# Patient Record
Sex: FEMALE | Race: OTHER | HISPANIC OR LATINO
[De-identification: names, ages, dates, MRNs, and addresses within clinical notes are randomized per-mention and may not be internally consistent; named-entity substitution may affect disease eponyms.]

---

## 2019-07-26 PROBLEM — Z00.00 ENCOUNTER FOR PREVENTIVE HEALTH EXAMINATION: Status: ACTIVE | Noted: 2019-07-26

## 2019-07-31 ENCOUNTER — APPOINTMENT (OUTPATIENT)
Dept: ORTHOPEDIC SURGERY | Facility: CLINIC | Age: 39
End: 2019-07-31
Payer: MEDICAID

## 2019-07-31 VITALS — HEART RATE: 78 BPM | SYSTOLIC BLOOD PRESSURE: 130 MMHG | DIASTOLIC BLOOD PRESSURE: 83 MMHG

## 2019-07-31 PROCEDURE — 99204 OFFICE O/P NEW MOD 45 MIN: CPT

## 2019-07-31 NOTE — HISTORY OF PRESENT ILLNESS
[FreeTextEntry1] : 07/31/2019: Patient is a 38-year-old right-hand-dominant female who works in a factory packing present, who presents to the office today with bilateral hand paresthesias in all fingers. Her symptoms have been present since June of last year. Her symptoms are constant and worse with driving. She has taken ibuprofen with very little relief. She presents today to the office for further treatment options.

## 2019-07-31 NOTE — CONSULT LETTER
[Consult Letter:] : I had the pleasure of evaluating your patient, [unfilled]. [Please see my note below.] : Please see my note below. [Consult Closing:] : Thank you very much for allowing me to participate in the care of this patient.  If you have any questions, please do not hesitate to contact me. [Sincerely,] : Sincerely, [FreeTextEntry3] : Dr. Dianne Gonzalez\par Orthopaedic Surgery\par Hand & Upper Extremity.\par

## 2019-07-31 NOTE — ASSESSMENT
[FreeTextEntry1] : Patient with bilateral carpal tunnel syndrome. The nature of this condition was described at length with the patient she verbalized understanding. It was confirmed with the results of an EMG from prior to her visit here. Conservative observation versus surgical release risks and benefits were discussed at length with the patient and she wishes to proceed with a surgical release of the right carpal tunnel. She'll be booked for the next available surgical date.

## 2019-08-09 ENCOUNTER — APPOINTMENT (OUTPATIENT)
Dept: ORTHOPEDIC SURGERY | Facility: AMBULATORY MEDICAL SERVICES | Age: 39
End: 2019-08-09
Payer: MEDICAID

## 2019-08-09 PROCEDURE — 64721 CARPAL TUNNEL SURGERY: CPT | Mod: RT

## 2019-08-16 ENCOUNTER — APPOINTMENT (OUTPATIENT)
Dept: ORTHOPEDIC SURGERY | Facility: CLINIC | Age: 39
End: 2019-08-16
Payer: MEDICAID

## 2019-08-16 PROCEDURE — 99213 OFFICE O/P EST LOW 20 MIN: CPT | Mod: 24

## 2019-08-16 NOTE — HISTORY OF PRESENT ILLNESS
[___ Days Post Op] : post op day #[unfilled] [Erythema] : not erythematous [Clean/Dry/Intact] : clean, dry and intact [Swelling] : swollen [Dehiscence] : not dehisced [Vascular Intact] : ~T peripheral vascular exam normal [Doing Well] : is doing well [Excellent Pain Control] : has excellent pain control [de-identified] : Right carpal tunnel release.\par DOS 8/9/19 [No Sign of Infection] : is showing no signs of infection [de-identified] : range of motion of the digits of the right hand is slightly limited secondary to swelling and stiffness to the palm distance 0.5 cm\par \par Left wrist positive Tinel positive Phalen's, subjective paresthesias in the thumb index and middle fingers, 2. discrimination 6 mm [de-identified] : the patient is a 38-year-old female 7 days status post right carpal tunnel release.She has improvement in the paresthesias in her digits, she has mild pain at the surgical site that is within normal limits. She continues to experience paresthesias in the left median nerve distribution which occur on a daily basis. [de-identified] : the patient is a 38-year-old female one week status post right carpal tunnel release healing well.She continues to have symptoms of left carpal tunnel syndrome, risks and benefits of continued conservative treatment versus surgical release were discussed the patient wishes to proceed with a left carpal tunnel release. She will be booked for the procedure and may continue activity as tolerated until that time.

## 2019-09-06 ENCOUNTER — APPOINTMENT (OUTPATIENT)
Dept: ORTHOPEDIC SURGERY | Facility: CLINIC | Age: 39
End: 2019-09-06
Payer: MEDICAID

## 2019-09-06 PROCEDURE — 99024 POSTOP FOLLOW-UP VISIT: CPT

## 2019-09-06 NOTE — HISTORY OF PRESENT ILLNESS
[Clean/Dry/Intact] : clean, dry and intact [Swelling] : swollen [Vascular Intact] : ~T peripheral vascular exam normal [Doing Well] : is doing well [Excellent Pain Control] : has excellent pain control [No Sign of Infection] : is showing no signs of infection [___ Months Post Op] : [unfilled] months post op [Erythema] : not erythematous [Dehiscence] : not dehisced [de-identified] : Right carpal tunnel release.\par DOS 8/9/19 [de-identified] : 8/16/19: the patient is a 38-year-old female 7 days status post right carpal tunnel release.She has improvement in the paresthesias in her digits, she has mild pain at the surgical site that is within normal limits. She continues to experience paresthesias in the left median nerve distribution which occur on a daily basis.\par \par 09/06/2019: Patient returns for repeat evaluation 1 month after right carpal tunnel release. Overall doing much better. No residual symptoms and no complaints of fevers or chills.  [de-identified] : Incision well healed. No signs of infection. Range of motion of wrist and digits full. Sensation grossly intact and distal pulses 2+. \par \par Left wrist positive Tinel positive Phalen's, subjective paresthesias in the thumb index and middle fingers, 2. discrimination 6 mm [de-identified] : s/p Right Carpal Tunnel Release [de-identified] : The patient is a 38-year-old female one month status post right carpal tunnel release healing well. She continues to have symptoms of left carpal tunnel syndrome. She is booked for the middle of the month for the left carpal tunnel release. She may continue activity as tolerated until that time.

## 2019-09-13 ENCOUNTER — APPOINTMENT (OUTPATIENT)
Dept: ORTHOPEDIC SURGERY | Facility: AMBULATORY MEDICAL SERVICES | Age: 39
End: 2019-09-13
Payer: MEDICAID

## 2019-09-13 PROCEDURE — 64721 CARPAL TUNNEL SURGERY: CPT | Mod: 79,LT

## 2019-09-20 ENCOUNTER — APPOINTMENT (OUTPATIENT)
Dept: ORTHOPEDIC SURGERY | Facility: CLINIC | Age: 39
End: 2019-09-20
Payer: MEDICAID

## 2019-09-20 DIAGNOSIS — Z98.890 OTHER SPECIFIED POSTPROCEDURAL STATES: ICD-10-CM

## 2019-09-20 DIAGNOSIS — G56.03 CARPAL TUNNEL SYNDROM,BILATERAL UPPER LIMBS: ICD-10-CM

## 2019-09-20 PROCEDURE — 99024 POSTOP FOLLOW-UP VISIT: CPT

## 2019-09-20 NOTE — HISTORY OF PRESENT ILLNESS
[Clean/Dry/Intact] : clean, dry and intact [Swelling] : swollen [Doing Well] : is doing well [Vascular Intact] : ~T peripheral vascular exam normal [Excellent Pain Control] : has excellent pain control [No Sign of Infection] : is showing no signs of infection [___ Days Post Op] : post op day #[unfilled] [Erythema] : not erythematous [Dehiscence] : not dehisced [de-identified] : 09/20/19: the patient is a 38-year-old female 7 days status post left carpal tunnel release. She has improvement in the paresthesias in her digits, she has mild pain at the surgical site that is within normal limits. She denies any fevers or chills. [de-identified] : Right carpal tunnel release.\par DOS 09/13/2019 [de-identified] : Incision well healed. No signs of infection. Range of motion of wrist and digits full. Sensation grossly intact and distal pulses 2+. \par \par Left wrist positive Tinel positive Phalen's, subjective paresthesias in the thumb index and middle fingers, 2. discrimination 6 mm [de-identified] : s/p Left Carpal Tunnel Release [de-identified] : The patient is a 38-year-old female 7 days status post left carpal tunnel release healing well. She may resume activity as tolerated  by this weekend and return to work on 09/30. The patient is in agreement with this plan.

## 2021-02-11 ENCOUNTER — APPOINTMENT (OUTPATIENT)
Dept: RHEUMATOLOGY | Facility: CLINIC | Age: 41
End: 2021-02-11

## 2021-04-08 ENCOUNTER — APPOINTMENT (OUTPATIENT)
Dept: RHEUMATOLOGY | Facility: CLINIC | Age: 41
End: 2021-04-08
Payer: MEDICAID

## 2021-04-08 VITALS
RESPIRATION RATE: 17 BRPM | TEMPERATURE: 98.6 F | DIASTOLIC BLOOD PRESSURE: 80 MMHG | SYSTOLIC BLOOD PRESSURE: 120 MMHG | HEIGHT: 60 IN

## 2021-04-08 DIAGNOSIS — Z83.3 FAMILY HISTORY OF DIABETES MELLITUS: ICD-10-CM

## 2021-04-08 DIAGNOSIS — I10 ESSENTIAL (PRIMARY) HYPERTENSION: ICD-10-CM

## 2021-04-08 DIAGNOSIS — M25.50 PAIN IN UNSPECIFIED JOINT: ICD-10-CM

## 2021-04-08 PROCEDURE — 99204 OFFICE O/P NEW MOD 45 MIN: CPT

## 2021-04-08 PROCEDURE — 99072 ADDL SUPL MATRL&STAF TM PHE: CPT

## 2021-04-08 RX ORDER — OXYCODONE AND ACETAMINOPHEN 5; 325 MG/1; MG/1
5-325 TABLET ORAL
Qty: 5 | Refills: 0 | Status: DISCONTINUED | COMMUNITY
Start: 2019-09-13 | End: 2021-04-08

## 2021-04-08 RX ORDER — OXYCODONE AND ACETAMINOPHEN 5; 325 MG/1; MG/1
5-325 TABLET ORAL EVERY 8 HOURS
Qty: 5 | Refills: 0 | Status: DISCONTINUED | COMMUNITY
Start: 2019-08-09 | End: 2021-04-08

## 2021-04-08 NOTE — PHYSICAL EXAM
[General Appearance - Well Nourished] : well nourished [General Appearance - Well Developed] : well developed [Sclera] : the sclera and conjunctiva were normal [Hearing Threshold Finger Rub Not Charles] : hearing was normal [Nail Clubbing] : no clubbing  or cyanosis of the fingernails [Motor Tone] : muscle strength and tone were normal [Affect] : the affect was normal [Mood] : the mood was normal [FreeTextEntry1] : finger with skin thickening and reduce wrinkles, no digital pitting

## 2021-04-08 NOTE — ASSESSMENT
[FreeTextEntry1] : 41 yo woman with poorly controlled diabetes presents with whole body pain.  most likley related to occupation however states that staying active helps her joint pain.  will evaluate for inflammatory arthropathy.  patient with tennis elbow most likely related to occupation and OA of the hands.  questionable raynauds and think skin over the finger.  unclear if diabetic skin changes. no prayer sign    \par \par --check inflammatory markers and serologies \par --will give mobic given GERd symptoms \par --will check celiac antibodies and sent to GI given frequent diarrhea \par --xray hands and elbows \par --tennis elbow strap \par --to consider spep and immunoglobulins on next visit \par

## 2021-04-08 NOTE — HISTORY OF PRESENT ILLNESS
[FreeTextEntry1] : 39 yo woman with poorly controlled diabetes ( on insulin), HTN, HLD, obesity, asthma , GERD,  CTS ( s/p surgery b/l hands) presents for evaluation of whole body pain.  started about 6 months ago.  pain worse in her elbows.  no joint swelling appreciated.   pain severe all day.  activity does improve pain.  she has morning stiffness for about 2 hours.  no back pain.  pain mostly of thenads , elbows although she states that her whole body hurts.  she works  at [employer] standing 8 hours a day. takes iburprofen with pain relieve \par \par patient with diarrhea that is frequent.  no blood in stool.  no n/v. not seen by GI \par \par patient complain of red burning eye on occasion and possibly related to being tire.  \par \par questionable Raynaud \par \par NO cough/sob, rashes, psoriasis, alopecia, malar, photosensitivity, oral ulcers, sinus issues, CP, serositis, dry mouth and eye, DVT/PE, pregnant 3 times full term babies noncomplicated. No history of STDs\par \par \par \par No RA, thyroidism, lupus in the family \par \par patient getting opioids from PMD  [Weight Loss] : no weight loss [Fever] : no fever [Chills] : no chills [Malar Facial Rash] : no malar facial rash [Skin Lesions] : no lesions [Skin Nodules] : no skin nodules [Oral Ulcers] : no oral ulcers [Cough] : no cough [Dry Mouth] : no dry mouth [Shortness of Breath] : no shortness of breath [Chest Pain] : no chest pain [Dyspnea] : no dyspnea [Dry Eyes] : no dry eyes

## 2021-04-08 NOTE — REVIEW OF SYSTEMS
[As Noted in HPI] : as noted in HPI [Diarrhea] : diarrhea [Fever] : no fever [Chills] : no chills [Nosebleeds] : no nosebleeds [Chest Pain] : no chest pain [Palpitations] : no palpitations [Cough] : no cough [SOB on Exertion] : no shortness of breath during exertion [Constipation] : no constipation

## 2021-04-14 ENCOUNTER — APPOINTMENT (OUTPATIENT)
Dept: RADIOLOGY | Facility: CLINIC | Age: 41
End: 2021-04-14
Payer: MEDICAID

## 2021-04-14 ENCOUNTER — OUTPATIENT (OUTPATIENT)
Dept: OUTPATIENT SERVICES | Facility: HOSPITAL | Age: 41
LOS: 1 days | End: 2021-04-14
Payer: MEDICAID

## 2021-04-14 DIAGNOSIS — M25.50 PAIN IN UNSPECIFIED JOINT: ICD-10-CM

## 2021-04-14 PROCEDURE — 73080 X-RAY EXAM OF ELBOW: CPT

## 2021-04-14 PROCEDURE — 73130 X-RAY EXAM OF HAND: CPT | Mod: 26,50

## 2021-04-14 PROCEDURE — 73080 X-RAY EXAM OF ELBOW: CPT | Mod: 26,50

## 2021-04-14 PROCEDURE — 73130 X-RAY EXAM OF HAND: CPT

## 2021-06-10 ENCOUNTER — APPOINTMENT (OUTPATIENT)
Dept: RHEUMATOLOGY | Facility: CLINIC | Age: 41
End: 2021-06-10
Payer: MEDICAID

## 2021-06-10 VITALS
HEIGHT: 60 IN | OXYGEN SATURATION: 96 % | RESPIRATION RATE: 17 BRPM | HEART RATE: 79 BPM | SYSTOLIC BLOOD PRESSURE: 108 MMHG | DIASTOLIC BLOOD PRESSURE: 78 MMHG | TEMPERATURE: 98.6 F

## 2021-06-10 VITALS
BODY MASS INDEX: 33.38 KG/M2 | WEIGHT: 170 LBS | OXYGEN SATURATION: 97 % | DIASTOLIC BLOOD PRESSURE: 78 MMHG | TEMPERATURE: 98.6 F | HEIGHT: 60 IN | SYSTOLIC BLOOD PRESSURE: 120 MMHG | RESPIRATION RATE: 17 BRPM | HEART RATE: 98 BPM

## 2021-06-10 DIAGNOSIS — R23.4 CHANGES IN SKIN TEXTURE: ICD-10-CM

## 2021-06-10 PROCEDURE — 99214 OFFICE O/P EST MOD 30 MIN: CPT

## 2021-06-10 NOTE — HISTORY OF PRESENT ILLNESS
[FreeTextEntry1] : 39 yo woman with poorly controlled diabetes ( on insulin), HTN, HLD, obesity, asthma , GERD,  CTS ( s/p surgery b/l hands) presents for evaluation of whole body pain.  started about 6 months ago.  pain worse in her elbows.  no joint swelling appreciated.   pain severe all day.  activity does improve pain.  she has morning stiffness for about 2 hours.  no back pain.  pain mostly of thenads , elbows although she states that her whole body hurts.  she works  at [employer] standing 8 hours a day. takes iburprofen with pain relieve \par \par patient with diarrhea that is frequent.  no blood in stool.  no n/v. not seen by GI \par \par patient complain of red burning eye on occasion and possibly related to being tire.  \par \par questionable Raynaud \par \par NO cough/sob, rashes, psoriasis, alopecia, malar, photosensitivity, oral ulcers, sinus issues, CP, serositis, dry mouth and eye, DVT/PE, pregnant 3 times full term babies noncomplicated. No history of STDs\par \par \par \par No RA, thyroidism, lupus in the family \par \par patient getting opioids from PMD \par \par today: patient was started on mobic and states that she feels better when she takes this.  the pain has subsided but still present.  when is works she is using the elbow straps.  she continues to complain of diffuse body pain.  she did not do blood work\par \par XRay: \par hand: Right distal phalangeal tuff tip avulsion.  tiny spicule of bone density adjacent to Right 2nd distal phalgeal tuft tip . other wise preserved Joint space \par \par elbow: normal

## 2021-06-10 NOTE — PHYSICAL EXAM
[General Appearance - Well Nourished] : well nourished [General Appearance - Well Developed] : well developed [Sclera] : the sclera and conjunctiva were normal [Hearing Threshold Finger Rub Not [place]] : hearing was normal [Nail Clubbing] : no clubbing  or cyanosis of the fingernails [Motor Tone] : muscle strength and tone were normal [] : no rash [FreeTextEntry1] : hands with thinken skin changes  [Affect] : the affect was normal [Mood] : the mood was normal

## 2021-06-10 NOTE — ASSESSMENT
[FreeTextEntry1] : 41 yo woman with poorly controlled diabetes presents with whole body pain and hands changes involving skin thinking and cyanosis.  No gross synovitis.  unclear if this related to scleroderma vs diabetic cheiroarthropathy vs scleromyxedema\par \par --check serologies, spep, \par --cont with mobic \par --cont with elboiw straps \par --start voltaren gel to apply over area of tenderness at elbow

## 2021-07-22 ENCOUNTER — APPOINTMENT (OUTPATIENT)
Dept: GASTROENTEROLOGY | Facility: CLINIC | Age: 41
End: 2021-07-22

## 2021-07-26 ENCOUNTER — APPOINTMENT (OUTPATIENT)
Dept: RHEUMATOLOGY | Facility: CLINIC | Age: 41
End: 2021-07-26
Payer: MEDICAID

## 2021-07-26 VITALS
DIASTOLIC BLOOD PRESSURE: 74 MMHG | SYSTOLIC BLOOD PRESSURE: 110 MMHG | OXYGEN SATURATION: 95 % | BODY MASS INDEX: 31.8 KG/M2 | HEIGHT: 60 IN | TEMPERATURE: 98 F | HEART RATE: 79 BPM | RESPIRATION RATE: 17 BRPM | WEIGHT: 162 LBS

## 2021-07-26 PROCEDURE — 99214 OFFICE O/P EST MOD 30 MIN: CPT

## 2021-07-26 NOTE — PHYSICAL EXAM
[General Appearance - Well Nourished] : well nourished [General Appearance - Well Developed] : well developed [Sclera] : the sclera and conjunctiva were normal [Hearing Threshold Finger Rub Not [place]] : hearing was normal [Nail Clubbing] : no clubbing  or cyanosis of the fingernails [Musculoskeletal - Swelling] : no joint swelling seen [Motor Tone] : muscle strength and tone were normal [FreeTextEntry1] : tender over the lateral epicndyl oif the right elbow but much imporved, right shoudler mild tenderness on ROM  [] : no rash [Skin Lesions] : no skin lesions [Affect] : the affect was normal [Mood] : the mood was normal

## 2021-07-26 NOTE — ASSESSMENT
[FreeTextEntry1] : 39 yo woman with arthralgias, sclerodactyly, Raynaud, dysphgia, cough and positive high titer SAMAN and centromere.  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  denies any GERD symptoms. she is to have a barium swallow study and see GI for evaluation \par --ideally she is to start PPI \par --raynauds will monitor: in cold weather to use mittens and use hand warmers.  will consider nifedipine \par --she is to see cardio for ECHO and screening for pHTN given SOB \par --tennis elbow most likely related to occupation.  she is to use elbow straps and voltaren gel.  improved from last visit \par --pending above work up will determine management

## 2021-07-26 NOTE — HISTORY OF PRESENT ILLNESS
[FreeTextEntry1] : 39 yo woman with poorly controlled diabetes ( on insulin), HTN, HLD, obesity, asthma , GERD,  CTS ( s/p surgery b/l hands) presents for evaluation of whole body pain.  started about 6 months ago.  pain worse in her elbows.  no joint swelling appreciated.   pain severe all day.  activity does improve pain.  she has morning stiffness for about 2 hours.  no back pain.  pain mostly of the hands , elbows although she states that her whole body hurts.  she works  at [employer] standing 8 hours a day. takes ibuprofen with pain relieve \par \par NO cough/sob, rashes, psoriasis, alopecia, malar, photosensitivity, oral ulcers, sinus issues, CP, serositis, dry mouth and eye, DVT/PE, pregnant 3 times full term babies noncomplicated. NO GERD symptoms. no muscle weakness. No history of STDs\par \par \par patient getting opioids from PMD \par \par today: patient is noted to have SAMAN 1:1280  centromere >8.    patient with sclerodactyly.  she has cyanosis and finger/toe pain in cold weather.  no digital ulcers. she complains of dysphagia with food getting stuck mid esophagua.  she also gives a history of cough when she eats.  she has sob since having covid infection.  she tennis elbow and shoulder pain.  she is wearing elbow strap when she works with [employer].  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  \par \par XRay: \par hand: Right distal phalangeal tuff tip avulsion.  tiny spicule of bone density adjacent to Right 2nd distal phalgeal tuft tip . other wise preserved Joint space \par \par elbow: normal

## 2021-07-29 ENCOUNTER — APPOINTMENT (OUTPATIENT)
Dept: RADIOLOGY | Facility: CLINIC | Age: 41
End: 2021-07-29
Payer: MEDICAID

## 2021-07-29 ENCOUNTER — OUTPATIENT (OUTPATIENT)
Dept: OUTPATIENT SERVICES | Facility: HOSPITAL | Age: 41
LOS: 1 days | End: 2021-07-29
Payer: MEDICAID

## 2021-07-29 DIAGNOSIS — M34.9 SYSTEMIC SCLEROSIS, UNSPECIFIED: ICD-10-CM

## 2021-07-29 PROCEDURE — 71046 X-RAY EXAM CHEST 2 VIEWS: CPT

## 2021-07-29 PROCEDURE — 71046 X-RAY EXAM CHEST 2 VIEWS: CPT | Mod: 26

## 2021-08-03 ENCOUNTER — APPOINTMENT (OUTPATIENT)
Dept: PULMONOLOGY | Facility: CLINIC | Age: 41
End: 2021-08-03

## 2021-08-12 ENCOUNTER — APPOINTMENT (OUTPATIENT)
Dept: PULMONOLOGY | Facility: CLINIC | Age: 41
End: 2021-08-12
Payer: MEDICAID

## 2021-08-12 VITALS
HEIGHT: 60 IN | RESPIRATION RATE: 16 BRPM | WEIGHT: 162 LBS | OXYGEN SATURATION: 98 % | TEMPERATURE: 97.8 F | HEART RATE: 80 BPM | BODY MASS INDEX: 31.8 KG/M2 | SYSTOLIC BLOOD PRESSURE: 115 MMHG | DIASTOLIC BLOOD PRESSURE: 70 MMHG

## 2021-08-12 DIAGNOSIS — J84.9 INTERSTITIAL PULMONARY DISEASE, UNSPECIFIED: ICD-10-CM

## 2021-08-12 PROCEDURE — 99203 OFFICE O/P NEW LOW 30 MIN: CPT

## 2021-09-17 ENCOUNTER — APPOINTMENT (OUTPATIENT)
Dept: CARDIOLOGY | Facility: CLINIC | Age: 41
End: 2021-09-17
Payer: MEDICAID

## 2021-09-17 VITALS
HEART RATE: 76 BPM | RESPIRATION RATE: 16 BRPM | HEIGHT: 60 IN | SYSTOLIC BLOOD PRESSURE: 128 MMHG | WEIGHT: 161 LBS | BODY MASS INDEX: 31.61 KG/M2 | DIASTOLIC BLOOD PRESSURE: 80 MMHG

## 2021-09-17 DIAGNOSIS — M34.9 SYSTEMIC SCLEROSIS, UNSPECIFIED: ICD-10-CM

## 2021-09-17 DIAGNOSIS — R07.9 CHEST PAIN, UNSPECIFIED: ICD-10-CM

## 2021-09-17 DIAGNOSIS — R00.2 PALPITATIONS: ICD-10-CM

## 2021-09-17 DIAGNOSIS — E11.9 TYPE 2 DIABETES MELLITUS W/OUT COMPLICATIONS: ICD-10-CM

## 2021-09-17 DIAGNOSIS — R06.00 DYSPNEA, UNSPECIFIED: ICD-10-CM

## 2021-09-17 DIAGNOSIS — Z78.9 OTHER SPECIFIED HEALTH STATUS: ICD-10-CM

## 2021-09-17 PROCEDURE — 99204 OFFICE O/P NEW MOD 45 MIN: CPT

## 2021-09-17 PROCEDURE — 93000 ELECTROCARDIOGRAM COMPLETE: CPT

## 2021-09-17 RX ORDER — MONTELUKAST 10 MG/1
10 TABLET, FILM COATED ORAL
Qty: 1 | Refills: 1 | Status: ACTIVE | COMMUNITY
Start: 2021-08-12

## 2021-09-17 RX ORDER — METFORMIN HYDROCHLORIDE 1000 MG/1
1000 TABLET, COATED ORAL TWICE DAILY
Refills: 0 | Status: ACTIVE | COMMUNITY

## 2021-09-17 RX ORDER — BENZONATATE 100 MG/1
100 CAPSULE ORAL
Qty: 90 | Refills: 3 | Status: ACTIVE | COMMUNITY
Start: 2021-08-12

## 2021-09-17 RX ORDER — DICLOFENAC SODIUM 1% 10 MG/G
1 GEL TOPICAL DAILY
Qty: 1 | Refills: 3 | Status: DISCONTINUED | COMMUNITY
Start: 2021-06-10 | End: 2021-09-17

## 2021-09-17 RX ORDER — MELOXICAM 15 MG/1
15 TABLET ORAL
Qty: 30 | Refills: 3 | Status: DISCONTINUED | COMMUNITY
Start: 2021-04-08 | End: 2021-09-17

## 2021-09-19 ENCOUNTER — LABORATORY RESULT (OUTPATIENT)
Age: 41
End: 2021-09-19

## 2021-09-19 ENCOUNTER — APPOINTMENT (OUTPATIENT)
Dept: DISASTER EMERGENCY | Facility: CLINIC | Age: 41
End: 2021-09-19

## 2021-09-22 ENCOUNTER — APPOINTMENT (OUTPATIENT)
Dept: PULMONOLOGY | Facility: CLINIC | Age: 41
End: 2021-09-22
Payer: MEDICAID

## 2021-09-22 VITALS — HEIGHT: 60 IN | BODY MASS INDEX: 31.41 KG/M2 | WEIGHT: 160 LBS

## 2021-09-22 PROCEDURE — 94010 BREATHING CAPACITY TEST: CPT

## 2021-09-22 NOTE — PHYSICAL EXAM
[Normal Oropharynx] : normal oropharynx [Normal Appearance] : normal appearance [Normal Rate/Rhythm] : normal rate/rhythm [Normal S1, S2] : normal s1, s2 [No Resp Distress] : no resp distress [Clear to Auscultation Bilaterally] : clear to auscultation bilaterally [Benign] : benign [No Clubbing] : no clubbing [No Cyanosis] : no cyanosis [No Edema] : no edema [Oriented x3] : oriented x3 [Normal Affect] : normal affect [TextBox_2] : overweight  [TextBox_11] : Cobblestoning

## 2021-09-22 NOTE — HISTORY OF PRESENT ILLNESS
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

## 2021-09-30 ENCOUNTER — RX CHANGE (OUTPATIENT)
Age: 41
End: 2021-09-30

## 2021-09-30 RX ORDER — FLUTICASONE PROPIONATE 50 UG/1
50 SPRAY, METERED NASAL DAILY
Qty: 3 | Refills: 0 | Status: ACTIVE | COMMUNITY
Start: 2021-08-12 | End: 1900-01-01

## 2021-11-03 ENCOUNTER — APPOINTMENT (OUTPATIENT)
Dept: CARDIOLOGY | Facility: CLINIC | Age: 41
End: 2021-11-03

## 2022-01-06 ENCOUNTER — APPOINTMENT (OUTPATIENT)
Dept: RHEUMATOLOGY | Facility: CLINIC | Age: 42
End: 2022-01-06

## 2022-02-07 ENCOUNTER — APPOINTMENT (OUTPATIENT)
Dept: PULMONOLOGY | Facility: CLINIC | Age: 42
End: 2022-02-07

## 2022-08-19 ENCOUNTER — OFFICE (OUTPATIENT)
Dept: URBAN - METROPOLITAN AREA CLINIC 115 | Facility: CLINIC | Age: 42
Setting detail: OPHTHALMOLOGY
End: 2022-08-19
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
## 2022-08-19 PROCEDURE — NO SHOW FE NO SHOW FEE: Performed by: OPTOMETRIST

## 2022-10-27 ENCOUNTER — EMERGENCY (EMERGENCY)
Facility: HOSPITAL | Age: 42
LOS: 1 days | Discharge: HOME | End: 2022-10-27
Attending: STUDENT IN AN ORGANIZED HEALTH CARE EDUCATION/TRAINING PROGRAM
Payer: COMMERCIAL

## 2022-10-27 VITALS
SYSTOLIC BLOOD PRESSURE: 160 MMHG | TEMPERATURE: 99 F | OXYGEN SATURATION: 99 % | HEART RATE: 100 BPM | DIASTOLIC BLOOD PRESSURE: 100 MMHG | RESPIRATION RATE: 15 BRPM | WEIGHT: 171.96 LBS | HEIGHT: 64 IN

## 2022-10-27 VITALS
OXYGEN SATURATION: 97 % | HEART RATE: 69 BPM | DIASTOLIC BLOOD PRESSURE: 81 MMHG | SYSTOLIC BLOOD PRESSURE: 120 MMHG | TEMPERATURE: 98 F | RESPIRATION RATE: 20 BRPM

## 2022-10-27 LAB
ALBUMIN SERPL ELPH-MCNC: 4.3 G/DL — SIGNIFICANT CHANGE UP (ref 3.3–5.2)
ALP SERPL-CCNC: 83 U/L — SIGNIFICANT CHANGE UP (ref 40–120)
ALT FLD-CCNC: 23 U/L — SIGNIFICANT CHANGE UP
ANION GAP SERPL CALC-SCNC: 14 MMOL/L — SIGNIFICANT CHANGE UP (ref 5–17)
AST SERPL-CCNC: 17 U/L — SIGNIFICANT CHANGE UP
BASOPHILS # BLD AUTO: 0.03 K/UL — SIGNIFICANT CHANGE UP (ref 0–0.2)
BASOPHILS NFR BLD AUTO: 0.4 % — SIGNIFICANT CHANGE UP (ref 0–2)
BILIRUB SERPL-MCNC: 0.4 MG/DL — SIGNIFICANT CHANGE UP (ref 0.4–2)
BUN SERPL-MCNC: 12.1 MG/DL — SIGNIFICANT CHANGE UP (ref 8–20)
CALCIUM SERPL-MCNC: 9.1 MG/DL — SIGNIFICANT CHANGE UP (ref 8.4–10.5)
CHLORIDE SERPL-SCNC: 97 MMOL/L — SIGNIFICANT CHANGE UP (ref 96–108)
CO2 SERPL-SCNC: 26 MMOL/L — SIGNIFICANT CHANGE UP (ref 22–29)
CREAT SERPL-MCNC: 0.47 MG/DL — LOW (ref 0.5–1.3)
EGFR: 123 ML/MIN/1.73M2 — SIGNIFICANT CHANGE UP
EOSINOPHIL # BLD AUTO: 0.36 K/UL — SIGNIFICANT CHANGE UP (ref 0–0.5)
EOSINOPHIL NFR BLD AUTO: 4.8 % — SIGNIFICANT CHANGE UP (ref 0–6)
GLUCOSE SERPL-MCNC: 328 MG/DL — HIGH (ref 70–99)
HCT VFR BLD CALC: 42.2 % — SIGNIFICANT CHANGE UP (ref 34.5–45)
HGB BLD-MCNC: 14.6 G/DL — SIGNIFICANT CHANGE UP (ref 11.5–15.5)
IMM GRANULOCYTES NFR BLD AUTO: 0.3 % — SIGNIFICANT CHANGE UP (ref 0–0.9)
LIDOCAIN IGE QN: 41 U/L — SIGNIFICANT CHANGE UP (ref 22–51)
LYMPHOCYTES # BLD AUTO: 3.08 K/UL — SIGNIFICANT CHANGE UP (ref 1–3.3)
LYMPHOCYTES # BLD AUTO: 41 % — SIGNIFICANT CHANGE UP (ref 13–44)
MCHC RBC-ENTMCNC: 30.3 PG — SIGNIFICANT CHANGE UP (ref 27–34)
MCHC RBC-ENTMCNC: 34.6 GM/DL — SIGNIFICANT CHANGE UP (ref 32–36)
MCV RBC AUTO: 87.6 FL — SIGNIFICANT CHANGE UP (ref 80–100)
MONOCYTES # BLD AUTO: 0.37 K/UL — SIGNIFICANT CHANGE UP (ref 0–0.9)
MONOCYTES NFR BLD AUTO: 4.9 % — SIGNIFICANT CHANGE UP (ref 2–14)
NEUTROPHILS # BLD AUTO: 3.66 K/UL — SIGNIFICANT CHANGE UP (ref 1.8–7.4)
NEUTROPHILS NFR BLD AUTO: 48.6 % — SIGNIFICANT CHANGE UP (ref 43–77)
PLATELET # BLD AUTO: 260 K/UL — SIGNIFICANT CHANGE UP (ref 150–400)
POTASSIUM SERPL-MCNC: 3.4 MMOL/L — LOW (ref 3.5–5.3)
POTASSIUM SERPL-SCNC: 3.4 MMOL/L — LOW (ref 3.5–5.3)
PROT SERPL-MCNC: 7.5 G/DL — SIGNIFICANT CHANGE UP (ref 6.6–8.7)
RBC # BLD: 4.82 M/UL — SIGNIFICANT CHANGE UP (ref 3.8–5.2)
RBC # FLD: 11.6 % — SIGNIFICANT CHANGE UP (ref 10.3–14.5)
SODIUM SERPL-SCNC: 137 MMOL/L — SIGNIFICANT CHANGE UP (ref 135–145)
WBC # BLD: 7.52 K/UL — SIGNIFICANT CHANGE UP (ref 3.8–10.5)
WBC # FLD AUTO: 7.52 K/UL — SIGNIFICANT CHANGE UP (ref 3.8–10.5)

## 2022-10-27 PROCEDURE — 93010 ELECTROCARDIOGRAM REPORT: CPT

## 2022-10-27 PROCEDURE — 99053 MED SERV 10PM-8AM 24 HR FAC: CPT

## 2022-10-27 PROCEDURE — 99285 EMERGENCY DEPT VISIT HI MDM: CPT

## 2022-10-27 RX ORDER — CYCLOBENZAPRINE HYDROCHLORIDE 10 MG/1
10 TABLET, FILM COATED ORAL ONCE
Refills: 0 | Status: COMPLETED | OUTPATIENT
Start: 2022-10-27 | End: 2022-10-27

## 2022-10-27 RX ORDER — SODIUM CHLORIDE 9 MG/ML
1000 INJECTION INTRAMUSCULAR; INTRAVENOUS; SUBCUTANEOUS ONCE
Refills: 0 | Status: COMPLETED | OUTPATIENT
Start: 2022-10-27 | End: 2022-10-27

## 2022-10-27 RX ORDER — ACETAMINOPHEN 500 MG
1000 TABLET ORAL ONCE
Refills: 0 | Status: COMPLETED | OUTPATIENT
Start: 2022-10-27 | End: 2022-10-27

## 2022-10-27 RX ORDER — KETOROLAC TROMETHAMINE 30 MG/ML
15 SYRINGE (ML) INJECTION ONCE
Refills: 0 | Status: DISCONTINUED | OUTPATIENT
Start: 2022-10-27 | End: 2022-10-27

## 2022-10-27 RX ADMIN — SODIUM CHLORIDE 1000 MILLILITER(S): 9 INJECTION INTRAMUSCULAR; INTRAVENOUS; SUBCUTANEOUS at 22:15

## 2022-10-27 RX ADMIN — CYCLOBENZAPRINE HYDROCHLORIDE 10 MILLIGRAM(S): 10 TABLET, FILM COATED ORAL at 22:16

## 2022-10-27 RX ADMIN — Medication 15 MILLIGRAM(S): at 22:13

## 2022-10-27 RX ADMIN — Medication 400 MILLIGRAM(S): at 22:15
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
## 2022-10-27 NOTE — ED ADULT NURSE NOTE - IN THE PAST 12 MONTHS HAVE YOU USED DRUGS OTHER THAN THOSE REQUIRED FOR MEDICAL REASON?
Interval Events:   Liver enzymes improving    Hospital Medications:  carvedilol 25 milliGRAM(s) Oral every 12 hours  chlorhexidine 2% Cloths 1 Application(s) Topical <User Schedule>  doxazosin 4 milliGRAM(s) Oral <User Schedule>  enoxaparin Injectable 60 milliGRAM(s) SubCutaneous <User Schedule>  epoetin cortney-epbx (RETACRIT) Injectable 91851 Unit(s) SubCutaneous every 7 days  finasteride 5 milliGRAM(s) Oral daily  fluconAZOLE IVPB 400 milliGRAM(s) IV Intermittent every 24 hours  insulin glargine Injectable (LANTUS) 5 Unit(s) SubCutaneous at bedtime  insulin lispro (ADMELOG) corrective regimen sliding scale   SubCutaneous three times a day with meals  insulin lispro (ADMELOG) corrective regimen sliding scale   SubCutaneous at bedtime  insulin lispro Injectable (ADMELOG) 4 Unit(s) SubCutaneous three times a day before meals  levothyroxine 50 MICROGram(s) Oral daily  melatonin 6 milliGRAM(s) Oral at bedtime  meropenem  IVPB      meropenem  IVPB 1000 milliGRAM(s) IV Intermittent every 12 hours  NIFEdipine XL 60 milliGRAM(s) Oral daily  pantoprazole    Tablet 40 milliGRAM(s) Oral daily  phenytoin   Capsule 100 milliGRAM(s) Oral every 8 hours  polyethylene glycol 3350 17 Gram(s) Oral daily  predniSONE   Tablet 5 milliGRAM(s) Oral daily  senna 2 Tablet(s) Oral at bedtime  sodium bicarbonate 1300 milliGRAM(s) Oral <User Schedule>  sodium chloride 0.9%. 1000 milliLiter(s) IV Continuous <Continuous>      ROS: All system reviewed and negative except as mentioned above.    PHYSICAL EXAM:   Vital Signs:  Vital Signs Last 24 Hrs  T(C): 36.9 (2022 15:00), Max: 38.8 (2022 03:00)  T(F): 98.4 (2022 15:00), Max: 101.8 (2022 03:00)  HR: 54 (2022 16:00) (54 - 70)  BP: 132/58 (2022 16:00) (106/52 - 172/74)  BP(mean): 83 (2022 16:00) (75 - 114)  RR: 18 (2022 16:00) (18 - 33)  SpO2: 100% (2022 16:00) (95% - 100%)    Parameters below as of 2022 09:53  Patient On (Oxygen Delivery Method): room air      Daily     Daily     GENERAL:  Ill appearing, in NAD  HEENT:  Sclera anicteric  CHEST:  Unlabored, CTAB  HEART:  RRR S1/S2, no JVD  ABDOMEN:  +BS, soft, non-tender, non-distended,  healed surgical scar  EXTREMITIES:  No cyanosis or edema  SKIN:  Warm & Dry. No rash or erythema  NEURO:  Awake, alert, conversant    LABS:                        8.3    3.49  )-----------( 356      ( 2022 14:45 )             24.9     Mean Cell Volume: 93.6 fl (22 @ 14:45)        129<L>  |  97  |  96<H>  ----------------------------<  206<H>  5.2   |  15<L>  |  3.17<H>    Ca    8.3<L>      2022 14:45  Phos  5.5       Mg     2.2         TPro  6.2  /  Alb  2.5<L>  /  TBili  0.4  /  DBili  x   /  AST  167<H>  /  ALT  364<H>  /  AlkPhos  287<H>      LIVER FUNCTIONS - ( 2022 14:45 )  Alb: 2.5 g/dL / Pro: 6.2 g/dL / ALK PHOS: 287 U/L / ALT: 364 U/L / AST: 167 U/L / GGT: x           PT/INR - ( 2022 14:45 )   PT: 16.9 sec;   INR: 1.46 ratio         PTT - ( 2022 14:45 )  PTT:39.1 sec  Urinalysis Basic - ( 2022 18:28 )    Color: Yellow / Appearance: Slightly Turbid / S.020 / pH: x  Gluc: x / Ketone: Negative  / Bili: Negative / Urobili: Negative   Blood: x / Protein: 100 / Nitrite: Negative   Leuk Esterase: Large / RBC: 2 /hpf / WBC 40 /HPF   Sq Epi: x / Non Sq Epi: 0 /hpf / Bacteria: Many      Amylase Serum--      Lipase serum28       Ammonia--                          8.3    3.49  )-----------( 356      ( 2022 14:45 )             24.9                         8.5    3.72  )-----------( 392      ( 2022 22:48 )             25.7                         8.8    4.52  )-----------( 413      ( 2022 18:28 )             27.6                         7.8    2.94  )-----------( 391      ( 2022 06:29 )             24.5                         7.7    2.71  )-----------( 399      ( 2022 07:05 )             24.1       Imaging: Images reviewed.     No

## 2022-10-28 LAB
APPEARANCE UR: CLEAR — SIGNIFICANT CHANGE UP
BACTERIA # UR AUTO: ABNORMAL
BILIRUB UR-MCNC: NEGATIVE — SIGNIFICANT CHANGE UP
COLOR SPEC: YELLOW — SIGNIFICANT CHANGE UP
DIFF PNL FLD: ABNORMAL
EPI CELLS # UR: SIGNIFICANT CHANGE UP
GLUCOSE UR QL: 1000 MG/DL
HCG SERPL-ACNC: <4 MIU/ML — SIGNIFICANT CHANGE UP
KETONES UR-MCNC: NEGATIVE — SIGNIFICANT CHANGE UP
LEUKOCYTE ESTERASE UR-ACNC: NEGATIVE — SIGNIFICANT CHANGE UP
NITRITE UR-MCNC: POSITIVE
PH UR: 6 — SIGNIFICANT CHANGE UP (ref 5–8)
PROT UR-MCNC: NEGATIVE — SIGNIFICANT CHANGE UP
RAPID RVP RESULT: SIGNIFICANT CHANGE UP
RBC CASTS # UR COMP ASSIST: SIGNIFICANT CHANGE UP /HPF (ref 0–4)
SARS-COV-2 RNA SPEC QL NAA+PROBE: SIGNIFICANT CHANGE UP
SP GR SPEC: 1.01 — SIGNIFICANT CHANGE UP (ref 1.01–1.02)
UROBILINOGEN FLD QL: NEGATIVE — SIGNIFICANT CHANGE UP
WBC UR QL: SIGNIFICANT CHANGE UP /HPF (ref 0–5)

## 2022-10-28 PROCEDURE — 84702 CHORIONIC GONADOTROPIN TEST: CPT

## 2022-10-28 PROCEDURE — 99285 EMERGENCY DEPT VISIT HI MDM: CPT | Mod: 25

## 2022-10-28 PROCEDURE — 96375 TX/PRO/DX INJ NEW DRUG ADDON: CPT

## 2022-10-28 PROCEDURE — 36415 COLL VENOUS BLD VENIPUNCTURE: CPT

## 2022-10-28 PROCEDURE — 87086 URINE CULTURE/COLONY COUNT: CPT

## 2022-10-28 PROCEDURE — 85025 COMPLETE CBC W/AUTO DIFF WBC: CPT

## 2022-10-28 PROCEDURE — 83690 ASSAY OF LIPASE: CPT

## 2022-10-28 PROCEDURE — 71046 X-RAY EXAM CHEST 2 VIEWS: CPT | Mod: 26

## 2022-10-28 PROCEDURE — 87186 SC STD MICRODIL/AGAR DIL: CPT

## 2022-10-28 PROCEDURE — 96374 THER/PROPH/DIAG INJ IV PUSH: CPT

## 2022-10-28 PROCEDURE — 0225U NFCT DS DNA&RNA 21 SARSCOV2: CPT

## 2022-10-28 PROCEDURE — 93005 ELECTROCARDIOGRAM TRACING: CPT

## 2022-10-28 PROCEDURE — 96361 HYDRATE IV INFUSION ADD-ON: CPT

## 2022-10-28 PROCEDURE — 80053 COMPREHEN METABOLIC PANEL: CPT

## 2022-10-28 PROCEDURE — 71046 X-RAY EXAM CHEST 2 VIEWS: CPT

## 2022-10-28 PROCEDURE — 81001 URINALYSIS AUTO W/SCOPE: CPT

## 2022-10-28 RX ORDER — CYCLOBENZAPRINE HYDROCHLORIDE 10 MG/1
1 TABLET, FILM COATED ORAL
Qty: 15 | Refills: 0
Start: 2022-10-28 | End: 2022-11-01
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
## 2022-10-28 NOTE — ED PROVIDER NOTE - PHYSICAL EXAMINATION
Gen: NAD, non-toxic, conversational  Eyes: PERRLA, EOMI   HENT: Normocephalic, atraumatic. External ears normal, no rhinorrhea, moist mucous membranes.   CV: RRR, no M/R/G  Resp: CTAB, non-labored, speaking without difficulty on room air  Abd: soft, non tender, non rigid, no guarding or rebound tenderness  Back: No CVAT bilaterally, no midline ttp  Skin: dry, wwp   Neuro: AOx3, speech is fluent and appropriate in Niuean, CN 2-12 intact, motor 5/5 & symmetric in BUE/BLE, sensation grossly intact with exception of LUE ulnar distribution past elbow, which worsens with medial elbow compression , FTN nl.   Psych: Mood concerned, affect euthymic

## 2022-10-28 NOTE — ED ADULT NURSE REASSESSMENT NOTE - NS ED NURSE REASSESS COMMENT FT1
pt resting in stretcher, A&Ox4, respirations even and unlabored. family at bedside. awaiting UA results, pt updated on plan of care. pt shows no s/s of acute distress at this time. safety precautions maintained.

## 2022-10-28 NOTE — ED PROVIDER NOTE - PATIENT PORTAL LINK FT
You can access the FollowMyHealth Patient Portal offered by [facility] by registering at the following website: [email or web address]. By joining [facility] FollowMyHealth portal, you will also be able to view your health information using other applications (apps) compatible with our system.
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
## 2022-10-31 RX ORDER — CEPHALEXIN 500 MG
1 CAPSULE ORAL
Qty: 28 | Refills: 0
Start: 2022-10-31 | End: 2022-11-06

## 2023-04-28 NOTE — ED ADULT NURSE NOTE - PAIN: PRESENCE, MLM
Rx Refill Note  Requested Prescriptions     Pending Prescriptions Disp Refills   • Tresiba FlexTouch 100 UNIT/ML solution pen-injector injection 1800 mL 1     Sig: Inject 20 Units under the skin into the appropriate area as directed Daily.   • BD Pen Needle Salome 2nd Gen 32G X 4 MM misc 50 each 3     Sig: Inject 1 pen as directed Daily.      Last office visit with prescribing clinician: 4/27/2023   Last telemedicine visit with prescribing clinician: 8/28/2023   Next office visit with prescribing clinician: 8/28/2023                         Would you like a call back once the refill request has been completed: [] Yes [] No    If the office needs to give you a call back, can they leave a voicemail: [] Yes [] No    Aura Fisher MA  04/28/23, 09:26 EDT  
complains of pain/discomfort

## 2023-10-19 ENCOUNTER — APPOINTMENT (OUTPATIENT)
Dept: OTOLARYNGOLOGY | Facility: CLINIC | Age: 43
End: 2023-10-19
Payer: MEDICAID

## 2023-10-19 VITALS
DIASTOLIC BLOOD PRESSURE: 79 MMHG | WEIGHT: 160 LBS | HEART RATE: 70 BPM | SYSTOLIC BLOOD PRESSURE: 113 MMHG | HEIGHT: 60 IN | BODY MASS INDEX: 31.41 KG/M2

## 2023-10-19 PROCEDURE — 31231 NASAL ENDOSCOPY DX: CPT

## 2023-10-19 PROCEDURE — 99204 OFFICE O/P NEW MOD 45 MIN: CPT | Mod: 25

## 2023-11-20 PROBLEM — J34.2 DNS (DEVIATED NASAL SEPTUM): Status: ACTIVE | Noted: 2023-10-19

## 2023-11-20 PROBLEM — J30.9 ALLERGIC RHINITIS: Status: ACTIVE | Noted: 2021-08-12

## 2023-11-20 PROBLEM — R44.2 PHANTOSMIA: Status: ACTIVE | Noted: 2023-10-19

## 2023-11-21 ENCOUNTER — APPOINTMENT (OUTPATIENT)
Dept: OTOLARYNGOLOGY | Facility: CLINIC | Age: 43
End: 2023-11-21

## 2023-11-21 DIAGNOSIS — J34.2 DEVIATED NASAL SEPTUM: ICD-10-CM

## 2023-11-21 DIAGNOSIS — R44.2 OTHER HALLUCINATIONS: ICD-10-CM

## 2023-11-21 DIAGNOSIS — J30.9 ALLERGIC RHINITIS, UNSPECIFIED: ICD-10-CM

## 2024-05-23 RX ORDER — AZELASTINE HYDROCHLORIDE 137 UG/1
137 SPRAY, METERED NASAL TWICE DAILY
Qty: 2 | Refills: 2 | Status: ACTIVE | COMMUNITY
Start: 2023-10-19 | End: 1900-01-01

## 2025-01-06 NOTE — ED ADULT NURSE NOTE - PRO INTERPRETER NEED 2
PFT Note:        Pt completed pulmonary function testing with DLCO.  Pre/post flow volume loops with 2.5mg Albuterol nebulizer.  Good Pt effort and cooperation.  All testing meets ATS recommendations.DLCO is an average of 2 maneuvers.  Predicted DLCO is corrected for a Hgb of 13.1, drawn on 12/16/2024. .    January 6, 2025.10:02 AM  Kimmie Hawkins RT       [place] Low Risk (score 7-11)
